# Patient Record
(demographics unavailable — no encounter records)

---

## 2017-02-05 NOTE — PDOC
History of Present Illness





<Rekha Rivera - Last Filed: 17 22:42>





- History of Present Illness


Beta Blocker Contraindications (Core Measure): Yes: Not Prescribed





<Annmarie Capps Natacha - Last Filed: 17 02:20>





- General


Chief Complaint: Chest Pain


Stated Complaint: CHEST PAIN


Time Seen by Provider: 17 21:53





- History of Present Illness


Initial Comments: 





17 22:42


The patient is a 29 year old female, with no significant past medical history, 

who presents to the emergency department with intermittent chest pain since 

yesterday. The patient reports the pain is intermittent and localized to the 

left sternal region. The patient states she noticed this chest pain with 

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 

for work. She reports not coming to the ED at the time because her symptoms 

resolved. She denies left arm numbness today. The patient states she has birth 

control implant in left arm.





She denies shortness of breath, headache and dizziness. She denies fever, chills

, nausea, vomit, diarrhea and constipation. She denies dysuria, frequency, 

urgency and hematuria. 





Allergies: NKDA


Past surgical history:  x2


Social history: Denies toxic habits


PCP - Dr. Durand


 (Rekha Rivera)








Past History





<Rekha Rivera - Last Filed: 17 22:42>





- Past Medical History


Anemia: No


Asthma: No


Cancer: No


Cardiac Disorders: No


Diabetes: No


HTN: No


Suicide Attempt (Hx): No


Seizures: No


Thyroid Disease: No





- Immunization History


Td Vaccination: Yes


TDAP Vaccination: Yes


Immunization Up to Date: Yes





- Psycho/Social/Smoking Cessation Hx


Anxiety: No


Suicidal Ideation: No


Smoking Status: No


Smoking History: Never smoked


Years of Tobacco Use: 0


Have you smoked in the past 12 months: No


Number of Cigarettes Smoked Daily: 0


Cigars Per Day: 0


Hx Alcohol Use: No


Drug/Substance Use Hx: No


Substance Use Type: None


Hx Substance Use Treatment: No





<Annmarie Capps Natacha - Last Filed: 17 02:20>





- Past Medical History


Allergies/Adverse Reactions: 


 Allergies











Allergy/AdvReac Type Severity Reaction Status Date / Time


 


No Known Allergies Allergy   Verified 17 21:38











Home Medications: 


Ambulatory Orders





NK [No Known Home Medication]  17 











**Review of Systems





- Review of Systems


Able to Perform ROS?: Yes





<Rekha Rivera - Last Filed: 17 22:42>





<Annmarie Capps - Last Filed: 17 02:20>





- Review of Systems


Comments:: 





17 22:43





CONSTITUTIONAL: 


Absent: fever, chills, diaphoresis, generalized weakness, malaise, loss of 

appetite


HEENT: 


Absent: rhinorrhea, nasal congestion, throat pain, throat swelling, difficulty 

swallowing, mouth swelling, ear pain, eye pain, visual Changes


CARDIOVASCULAR: 


(+) chest pain, Absent:  syncope, palpitations, irregular heart rate, 

lightheadedness, peripheral edema


RESPIRATORY: 


Absent: cough, shortness of breath, dyspnea with exertion, orthopnea, wheezing, 

stridor, hemoptysis


GASTROINTESTINAL:


Absent: abdominal pain, abdominal distension, nausea, vomiting, diarrhea, 

constipation, melena, hematochezia


GENITOURINARY: 


Absent: dysuria, frequency, urgency, hesitancy, hematuria, flank pain, genital 

pain


MUSCULOSKELETAL: 


Absent: myalgia, arthralgia, joint swelling


SKIN: 


Absent: rash, itching, pallor


HEMATOLOGIC/IMMUNOLOGIC: 


Absent: easy bleeding, easy bruising, lymphadenopathy, frequent infections


ENDOCRINE:


Absent: unexplained weight gain, unexplained weight loss, heat intolerance, 

cold intolerance


NEUROLOGIC: 


Absent: headache, focal weakness or paresthesias, dizziness, unsteady gait, 

seizure, mental


status changes, bladder or bowel incontinence


PSYCHIATRIC: 


Absent: anxiety, depression, suicidal or homicidal ideation, hallucinations.





 (Rekha Rivera)








*Physical Exam





<Rekha Rivera - Last Filed: 17 22:42>





<Annmarie Capps - Last Filed: 17 02:20>





- Vital Signs


 Last Vital Signs











Temp Pulse Resp BP Pulse Ox


 


 97.7 F   67   18   100/61   100 


 


 17 21:39  17 21:39  17 21:39  17 21:39  17 21:39

















- Physical Exam


Comments: 





17 22:43


GENERAL:


Well developed, well nourished. Awake and alert. No acute distress.


HEENT:


Normocephalic, atraumatic. PERRLA, EOMI. No conjunctival pallor. Sclera are non-

icteric. Moist mucous membranes. Oropharynx is clear.


NECK: 


Supple. Full ROM. No JVD. Carotid pulses 2+ and symmetric, without bruits. No 

thyromegaly. No lymphadenopathy.


CARDIOVASCULAR:


Regular rate and rhythm. No murmurs, rubs, or gallops. Distal pulses are 2+ and 

symmetric. 


PULMONARY: 


No evidence of respiratory distress. Lungs clear to auscultation bilaterally. 

No wheezing, rales or rhonchi.


ABDOMINAL:


Soft. Non-tender. Non-distended. No rebound or guarding. No organomegaly. 

Normoactive bowel sounds. 


MUSCULOSKELETAL 


Normal range of motion at all joints. No bony deformities or tenderness. No CVA 

tenderness.


EXTREMITIES: 


No cyanosis. No clubbing. No edema. No calf tenderness.


SKIN: 


Warm and dry. Normal capillary refill. No rashes. No jaundice. 


NEUROLOGICAL: 


Alert, awake, appropriate. Cranial nerves 2-12 intact. Normoreflexic in the 

upper and lower extremities. Normal speech. Toes are down-going bilaterally. 

Gait is normal without ataxia.


PSYCHIATRIC: 


Cooperative. Good eye contact. Appropriate mood and affect.


 (Rkeha Rivera)








ED Treatment Course





- LABORATORY


CBC & Chemistry Diagram: 


 17 22:33





 17 22:33





<Rekha Rivera - Last Filed: 17 22:42>





- LABORATORY


CBC & Chemistry Diagram: 


 17 22:33





 17 22:33





<Annmarie Capps - Last Filed: 17 02:20>





- ADDITIONAL ORDERS


Additional order review: 


 Laboratory  Results











  17





  23:38 22:33 22:33


 


Sodium   143 


 


Potassium   3.4 L 


 


Chloride   107 


 


Carbon Dioxide   31 


 


Anion Gap   5 L 


 


BUN   8 


 


Creatinine   0.7 


 


Creat Clearance w eGFR   > 60 


 


Random Glucose   89 


 


Calcium   8.3 L 


 


Total Bilirubin   0.2  D 


 


AST   12 L 


 


ALT   13 


 


Alkaline Phosphatase   48 


 


Creatine Kinase    76


 


Troponin I    < 0.02


 


Total Protein   7.5 


 


Albumin   3.7 


 


Urine HCG, Qual  Negative  








 











  17





  22:33


 


RBC  4.30


 


MCV  96.0


 


MCHC  33.3


 


RDW  13.7


 


MPV  9.1

















Medical Decision Making





<Rekha Rivera - Last Filed: 17 22:42>





<Annmarie Capps - Last Filed: 17 02:20>





- Medical Decision Making





17 00:23


30 yo female who denies any significant PMH p/w  palpable chest pain and states 

that she had experienced some left arm numbness earlier


-ekg  sinus ras @ 56 ,no change from prior ekg


fist trop neg 


-pt now wants to leave before  or repeat trop


-she was told of the risks and consequences of leaving at si time (Annmarie Capps)








*DC/Admit/Observation/Transfer





<Rekha Rivera - Last Filed: 17 22:42>





<Annmarie Capps - Last Filed: 17 02:20>


Diagnosis at time of Disposition: 


 Chest wall pain





- Discharge Dispostion


Disposition: AGAINST MEDICAL ADVICE





- Referrals


Referrals: 


Tori Durand MD [Primary Care Provider] - 





- Attestations


Scribe Attestion: 





17 22:43





Documentation prepared by Rekha Rivera, acting as medical scribe for Annmarie Capps MD (Rekha Rivera)

## 2017-02-06 NOTE — EKG
Test Reason : 

Blood Pressure : ***/*** mmHG

Vent. Rate : 056 BPM     Atrial Rate : 056 BPM

   P-R Int : 136 ms          QRS Dur : 084 ms

    QT Int : 410 ms       P-R-T Axes : 052 076 069 degrees

   QTc Int : 395 ms

 

SINUS BRADYCARDIA WITH MARKED SINUS ARRHYTHMIA

OTHERWISE NORMAL ECG

WHEN COMPARED WITH ECG OF 25-JAN-2015 21:53,

QUESTIONABLE CHANGE IN QRS AXIS

T WAVE INVERSION NO LONGER EVIDENT IN INFERIOR LEADS

Confirmed by SARAH ADDISON, DREW ([id number]) on 2/6/2017 1:17:04 PM

 

Referred By:             Confirmed By:DREW SMITH MD

## 2017-05-04 NOTE — EKG
Test Reason : 

Blood Pressure : ***/*** mmHG

Vent. Rate : 058 BPM     Atrial Rate : 058 BPM

   P-R Int : 134 ms          QRS Dur : 080 ms

    QT Int : 410 ms       P-R-T Axes : 054 072 068 degrees

   QTc Int : 402 ms

 

SINUS BRADYCARDIA

OTHERWISE NORMAL ECG

WHEN COMPARED WITH ECG OF 05-FEB-2017 22:36,

NO SIGNIFICANT CHANGE WAS FOUND

Confirmed by WILLIAM ENCISO MD ([id number]) on 5/4/2017 5:28:56 PM

 

Referred By:             Confirmed By:WILLIAM ENCISO MD